# Patient Record
Sex: MALE | Race: WHITE | URBAN - METROPOLITAN AREA
[De-identification: names, ages, dates, MRNs, and addresses within clinical notes are randomized per-mention and may not be internally consistent; named-entity substitution may affect disease eponyms.]

---

## 2022-01-21 ENCOUNTER — ADULT PHYSICAL (OUTPATIENT)
Dept: URBAN - METROPOLITAN AREA CLINIC 56 | Facility: CLINIC | Age: 67
End: 2022-01-21
Payer: MEDICARE

## 2022-01-21 DIAGNOSIS — Z01.818 ENCOUNTER FOR OTHER PREPROCEDURAL EXAMINATION: Primary | ICD-10-CM

## 2022-01-21 DIAGNOSIS — C44.111 BASAL CELL CARCINOMA OF SKIN OF EYELID INCLUDING CANTHUS: Primary | ICD-10-CM

## 2022-01-21 PROCEDURE — 92285 EXTERNAL OCULAR PHOTOGRAPHY: CPT | Performed by: OPHTHALMOLOGY

## 2022-01-21 PROCEDURE — 99203 OFFICE O/P NEW LOW 30 MIN: CPT | Performed by: PHYSICIAN ASSISTANT

## 2022-01-21 PROCEDURE — 99204 OFFICE O/P NEW MOD 45 MIN: CPT | Performed by: OPHTHALMOLOGY

## 2022-01-21 RX ORDER — NEOMYCIN SULFATE, POLYMYXIN B SULFATE AND DEXAMETHASONE 3.5; 10000; 1 MG/G; [USP'U]/G; MG/G
OINTMENT OPHTHALMIC
Qty: 2 | Refills: 1 | Status: ACTIVE
Start: 2022-01-21

## 2022-01-21 NOTE — IMPRESSION/PLAN
Impression: Basal cell carcinoma of skin of eyelid including canthus: C44.111. Plan: Patient has a confirmed Basal Cell Carcinoma located RLL; pathology confirmed by [facility](Dr. Danae Yi MD). Explained the [facility] procedure to the patient, will coordinate with patients preferred Dermatologist to help with removal and next day closure in the OR. RBAI of reconstruction d/w patient. All questions answered.  Will have a plan once photos are received from the Dermatologist.

## 2022-01-28 ENCOUNTER — SURGERY (OUTPATIENT)
Dept: URBAN - METROPOLITAN AREA SURGERY 19 | Facility: SURGERY | Age: 67
End: 2022-01-28
Payer: MEDICARE

## 2022-01-28 PROCEDURE — 14061 TIS TRNFR E/N/E/L10.1-30SQCM: CPT | Performed by: OPHTHALMOLOGY

## 2022-02-07 ENCOUNTER — POST-OPERATIVE VISIT (OUTPATIENT)
Dept: URBAN - METROPOLITAN AREA CLINIC 51 | Facility: CLINIC | Age: 67
End: 2022-02-07
Payer: MEDICARE

## 2022-02-07 PROCEDURE — 99024 POSTOP FOLLOW-UP VISIT: CPT | Performed by: OPHTHALMOLOGY

## 2022-02-07 NOTE — IMPRESSION/PLAN
Impression: S/P Adjacent Tissue Rearrangement 10.1-30.0 square centimeters - Right lower eyelid/cheek OD - 10 Days. Encounter for other specified surgical aftercare  Z48.89.  Plan: sutures removed today, healing well, f/u with Dr Hellen Hunt 1 month

## 2022-03-18 ENCOUNTER — POST-OPERATIVE VISIT (OUTPATIENT)
Dept: URBAN - METROPOLITAN AREA CLINIC 56 | Facility: CLINIC | Age: 67
End: 2022-03-18

## 2022-03-18 DIAGNOSIS — Z48.89 ENCOUNTER FOR OTHER SPECIFIED SURGICAL AFTERCARE: Primary | ICD-10-CM

## 2022-03-18 PROCEDURE — 99024 POSTOP FOLLOW-UP VISIT: CPT | Performed by: OPHTHALMOLOGY

## 2022-03-18 NOTE — IMPRESSION/PLAN
Impression: S/P Adjacent Tissue Rearrangement 10.1-30.0 square centimeters - Right lower eyelid/cheek OD - 49 Days. Encounter for other specified surgical aftercare  Z48.89. Excellent post op course   Post operative instructions reviewed - Condition is improving - Plan: very mild RLL retraction. Area injected with Kenalog and 5-FU today without complication after RBA were d/w patient. Instructed patient to massage area superiorly. RTC 1 month or sooner PRN. The area of hypertrophic scar tissue was cleaned. Thereafter Kenalog (10mg/kg) and Flurorucil 500mg/ml) was injected into the area of scarring.

## 2022-04-18 ENCOUNTER — POST-OPERATIVE VISIT (OUTPATIENT)
Dept: URBAN - METROPOLITAN AREA CLINIC 56 | Facility: CLINIC | Age: 67
End: 2022-04-18

## 2022-04-18 DIAGNOSIS — Z48.89 ENCOUNTER FOR OTHER SPECIFIED SURGICAL AFTERCARE: Primary | ICD-10-CM

## 2022-04-18 PROCEDURE — 99024 POSTOP FOLLOW-UP VISIT: CPT | Performed by: OPHTHALMOLOGY

## 2022-04-18 NOTE — IMPRESSION/PLAN
Impression:  Encounter for other specified surgical aftercare  Z48.89. 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 Explained that if ectropion worsens or develops tearing, may need to perform additional surgery to repair ectropion with LTS and/or FTSG. All questions answered. RTC PRN.